# Patient Record
Sex: FEMALE | Race: WHITE | NOT HISPANIC OR LATINO | URBAN - METROPOLITAN AREA
[De-identification: names, ages, dates, MRNs, and addresses within clinical notes are randomized per-mention and may not be internally consistent; named-entity substitution may affect disease eponyms.]

---

## 2017-05-05 ENCOUNTER — FOLLOW UP ESTABLISHED (OUTPATIENT)
Dept: URBAN - METROPOLITAN AREA CLINIC 30 | Facility: CLINIC | Age: 82
End: 2017-05-05
Payer: MEDICARE

## 2017-05-05 DIAGNOSIS — H43.813 VITREOUS DEGENERATION, BILATERAL: ICD-10-CM

## 2017-05-05 DIAGNOSIS — H35.342 MACULAR CYST, HOLE, OR PSEUDOHOLE, LEFT EYE: Primary | ICD-10-CM

## 2017-05-05 PROCEDURE — 92014 COMPRE OPH EXAM EST PT 1/>: CPT | Performed by: OPTOMETRIST

## 2017-05-05 PROCEDURE — 92134 CPTRZ OPH DX IMG PST SGM RTA: CPT | Performed by: OPTOMETRIST

## 2017-05-05 RX ORDER — PREDNISOLONE ACETATE 10 MG/ML
1 % SUSPENSION/ DROPS OPHTHALMIC
Qty: 1 | Refills: 0 | Status: INACTIVE
Start: 2017-05-05 | End: 2017-06-06

## 2017-05-05 ASSESSMENT — VISUAL ACUITY
OD: 20/70
OS: 20/125

## 2017-05-05 ASSESSMENT — INTRAOCULAR PRESSURE
OD: 18
OS: 15

## 2017-06-06 ENCOUNTER — FOLLOW UP ESTABLISHED (OUTPATIENT)
Dept: URBAN - METROPOLITAN AREA CLINIC 30 | Facility: CLINIC | Age: 82
End: 2017-06-06
Payer: MEDICARE

## 2017-06-06 PROCEDURE — 92133 CPTRZD OPH DX IMG PST SGM ON: CPT | Performed by: OPTOMETRIST

## 2017-06-06 PROCEDURE — 92025 CPTRIZED CORNEAL TOPOGRAPHY: CPT | Performed by: OPTOMETRIST

## 2017-06-06 RX ORDER — BIMATOPROST 0.1 MG/ML
0.01 % SOLUTION/ DROPS OPHTHALMIC
Qty: 3 | Refills: 3 | Status: INACTIVE
Start: 2017-06-06 | End: 2017-09-20

## 2017-06-06 RX ORDER — PREDNISOLONE ACETATE 10 MG/ML
1 % SUSPENSION/ DROPS OPHTHALMIC
Qty: 1 | Refills: 0 | Status: INACTIVE
Start: 2017-06-06 | End: 2018-01-09

## 2017-06-06 ASSESSMENT — VISUAL ACUITY
OS: 20/60
OD: 20/50

## 2017-06-06 ASSESSMENT — KERATOMETRY
OD: 43.03
OS: 43.34

## 2017-06-06 ASSESSMENT — INTRAOCULAR PRESSURE
OS: 18
OD: 17

## 2017-07-20 ENCOUNTER — FOLLOW UP ESTABLISHED (OUTPATIENT)
Dept: URBAN - METROPOLITAN AREA CLINIC 30 | Facility: CLINIC | Age: 82
End: 2017-07-20
Payer: MEDICARE

## 2017-07-20 DIAGNOSIS — H18.59 OTHER HEREDITARY CORNEAL DYSTROPHIES: ICD-10-CM

## 2017-07-20 PROCEDURE — 99213 OFFICE O/P EST LOW 20 MIN: CPT | Performed by: OPTOMETRIST

## 2017-07-20 ASSESSMENT — VISUAL ACUITY
OS: 20/60
OD: 20/50

## 2017-07-20 ASSESSMENT — INTRAOCULAR PRESSURE
OD: 21
OS: 19

## 2017-07-20 ASSESSMENT — KERATOMETRY
OS: 42.45
OD: 43.15

## 2018-01-09 ENCOUNTER — FOLLOW UP ESTABLISHED (OUTPATIENT)
Dept: URBAN - METROPOLITAN AREA CLINIC 30 | Facility: CLINIC | Age: 83
End: 2018-01-09
Payer: MEDICARE

## 2018-01-09 PROCEDURE — 99213 OFFICE O/P EST LOW 20 MIN: CPT | Performed by: OPTOMETRIST

## 2018-01-09 ASSESSMENT — INTRAOCULAR PRESSURE
OD: 15
OS: 14

## 2018-01-09 ASSESSMENT — KERATOMETRY
OD: 42.80
OS: 42.94

## 2018-03-14 ENCOUNTER — FOLLOW UP ESTABLISHED (OUTPATIENT)
Dept: URBAN - METROPOLITAN AREA CLINIC 30 | Facility: CLINIC | Age: 83
End: 2018-03-14
Payer: MEDICARE

## 2018-03-14 PROCEDURE — 92012 INTRM OPH EXAM EST PATIENT: CPT | Performed by: OPTOMETRIST

## 2018-03-14 RX ORDER — PREDNISOLONE ACETATE 10 MG/ML
1 % SUSPENSION/ DROPS OPHTHALMIC
Qty: 5 | Refills: 1 | Status: INACTIVE
Start: 2018-03-14 | End: 2018-07-03

## 2018-03-14 ASSESSMENT — INTRAOCULAR PRESSURE
OS: 19
OD: 20

## 2018-07-03 ENCOUNTER — FOLLOW UP ESTABLISHED (OUTPATIENT)
Dept: URBAN - METROPOLITAN AREA CLINIC 30 | Facility: CLINIC | Age: 83
End: 2018-07-03
Payer: MEDICARE

## 2018-07-03 PROCEDURE — 92134 CPTRZ OPH DX IMG PST SGM RTA: CPT | Performed by: OPTOMETRIST

## 2018-07-03 PROCEDURE — 92014 COMPRE OPH EXAM EST PT 1/>: CPT | Performed by: OPTOMETRIST

## 2018-07-03 ASSESSMENT — VISUAL ACUITY
OS: 20/50
OD: 20/50

## 2018-07-03 ASSESSMENT — INTRAOCULAR PRESSURE
OS: 17
OS: 14
OD: 16
OD: 17

## 2018-08-20 ENCOUNTER — FOLLOW UP ESTABLISHED (OUTPATIENT)
Dept: URBAN - METROPOLITAN AREA CLINIC 30 | Facility: CLINIC | Age: 83
End: 2018-08-20
Payer: MEDICARE

## 2018-08-20 PROCEDURE — 92012 INTRM OPH EXAM EST PATIENT: CPT | Performed by: OPTOMETRIST

## 2018-08-20 PROCEDURE — 92133 CPTRZD OPH DX IMG PST SGM ON: CPT | Performed by: OPTOMETRIST

## 2018-08-20 PROCEDURE — 92083 EXTENDED VISUAL FIELD XM: CPT | Performed by: OPTOMETRIST

## 2018-08-20 RX ORDER — POLYETHYLENE GLYCOL 400 AND PROPYLENE GLYCOL 4; 3 MG/ML; MG/ML
SOLUTION/ DROPS OPHTHALMIC
Qty: 0 | Refills: 0 | Status: ACTIVE
Start: 2018-08-20

## 2018-08-20 ASSESSMENT — INTRAOCULAR PRESSURE
OS: 17
OD: 17

## 2018-08-20 ASSESSMENT — VISUAL ACUITY
OS: 20/60
OD: 20/30

## 2019-02-25 ENCOUNTER — FOLLOW UP ESTABLISHED (OUTPATIENT)
Dept: URBAN - METROPOLITAN AREA CLINIC 30 | Facility: CLINIC | Age: 84
End: 2019-02-25
Payer: MEDICARE

## 2019-02-25 PROCEDURE — 92083 EXTENDED VISUAL FIELD XM: CPT | Performed by: OPTOMETRIST

## 2019-02-25 PROCEDURE — 99213 OFFICE O/P EST LOW 20 MIN: CPT | Performed by: OPTOMETRIST

## 2019-02-25 ASSESSMENT — INTRAOCULAR PRESSURE
OS: 16
OD: 17

## 2019-08-26 ENCOUNTER — FOLLOW UP ESTABLISHED (OUTPATIENT)
Dept: URBAN - METROPOLITAN AREA CLINIC 30 | Facility: CLINIC | Age: 84
End: 2019-08-26
Payer: MEDICARE

## 2019-08-26 PROCEDURE — 92133 CPTRZD OPH DX IMG PST SGM ON: CPT | Performed by: OPTOMETRIST

## 2019-08-26 PROCEDURE — 92014 COMPRE OPH EXAM EST PT 1/>: CPT | Performed by: OPTOMETRIST

## 2019-08-26 ASSESSMENT — KERATOMETRY
OS: 43.24
OD: 42.53

## 2019-08-26 ASSESSMENT — VISUAL ACUITY
OS: 20/40
OD: 20/30

## 2019-08-26 ASSESSMENT — INTRAOCULAR PRESSURE
OS: 15
OD: 15

## 2020-02-26 ENCOUNTER — FOLLOW UP ESTABLISHED (OUTPATIENT)
Dept: URBAN - METROPOLITAN AREA CLINIC 30 | Facility: CLINIC | Age: 85
End: 2020-02-26
Payer: MEDICARE

## 2020-02-26 DIAGNOSIS — H16.223 KERATOCONJUNCT SICCA, NOT SPECIFIED AS SJOGREN'S, BILATERAL: ICD-10-CM

## 2020-02-26 PROCEDURE — 99213 OFFICE O/P EST LOW 20 MIN: CPT | Performed by: OPTOMETRIST

## 2020-02-26 PROCEDURE — 92083 EXTENDED VISUAL FIELD XM: CPT | Performed by: OPTOMETRIST

## 2020-02-26 RX ORDER — CYCLOSPORINE 0.5 MG/ML
0.05 % EMULSION OPHTHALMIC
Qty: 60 | Refills: 6 | Status: INACTIVE
Start: 2020-02-26 | End: 2020-08-26

## 2020-02-26 ASSESSMENT — INTRAOCULAR PRESSURE
OS: 15
OD: 15

## 2020-08-26 ENCOUNTER — FOLLOW UP ESTABLISHED (OUTPATIENT)
Dept: URBAN - METROPOLITAN AREA CLINIC 30 | Facility: CLINIC | Age: 85
End: 2020-08-26
Payer: MEDICARE

## 2020-08-26 PROCEDURE — 92014 COMPRE OPH EXAM EST PT 1/>: CPT | Performed by: OPTOMETRIST

## 2020-08-26 PROCEDURE — 92134 CPTRZ OPH DX IMG PST SGM RTA: CPT | Performed by: OPTOMETRIST

## 2020-08-26 ASSESSMENT — KERATOMETRY
OS: 43.25
OD: 42.50

## 2020-08-26 ASSESSMENT — INTRAOCULAR PRESSURE
OS: 16
OD: 19

## 2020-08-26 ASSESSMENT — VISUAL ACUITY
OD: 20/40
OS: 20/80

## 2021-03-29 ENCOUNTER — FOLLOW UP ESTABLISHED (OUTPATIENT)
Dept: URBAN - METROPOLITAN AREA CLINIC 30 | Facility: CLINIC | Age: 86
End: 2021-03-29
Payer: MEDICARE

## 2021-03-29 DIAGNOSIS — H40.1132 PRIMARY OPEN-ANGLE GLAUCOMA, BILATERAL, MODERATE STAGE: Primary | ICD-10-CM

## 2021-03-29 DIAGNOSIS — H52.4 PRESBYOPIA: ICD-10-CM

## 2021-03-29 PROCEDURE — 92083 EXTENDED VISUAL FIELD XM: CPT | Performed by: OPTOMETRIST

## 2021-03-29 PROCEDURE — 99214 OFFICE O/P EST MOD 30 MIN: CPT | Performed by: OPTOMETRIST

## 2021-03-29 PROCEDURE — 92133 CPTRZD OPH DX IMG PST SGM ON: CPT | Performed by: OPTOMETRIST

## 2021-03-29 ASSESSMENT — INTRAOCULAR PRESSURE
OS: 18
OD: 19

## 2021-03-29 ASSESSMENT — VISUAL ACUITY
OD: 20/30
OS: 20/60

## 2021-06-18 ENCOUNTER — OFFICE VISIT (OUTPATIENT)
Dept: URBAN - METROPOLITAN AREA CLINIC 24 | Facility: CLINIC | Age: 86
End: 2021-06-18
Payer: MEDICARE

## 2021-06-18 PROCEDURE — 92020 GONIOSCOPY: CPT | Performed by: OPHTHALMOLOGY

## 2021-06-18 PROCEDURE — 99204 OFFICE O/P NEW MOD 45 MIN: CPT | Performed by: OPHTHALMOLOGY

## 2021-06-18 PROCEDURE — 92083 EXTENDED VISUAL FIELD XM: CPT | Performed by: OPHTHALMOLOGY

## 2021-06-18 RX ORDER — PREDNISOLONE ACETATE 10 MG/ML
1 % SUSPENSION/ DROPS OPHTHALMIC
Qty: 5 | Refills: 1 | Status: INACTIVE
Start: 2021-06-18 | End: 2022-01-05

## 2021-06-18 RX ORDER — PREDNISOLONE ACETATE 10 MG/ML
1 % SUSPENSION/ DROPS OPHTHALMIC
Qty: 5 | Refills: 1 | Status: INACTIVE
Start: 2021-06-18 | End: 2021-06-18

## 2021-06-18 ASSESSMENT — INTRAOCULAR PRESSURE
OS: 20
OD: 21

## 2021-06-18 NOTE — IMPRESSION/PLAN
Impression: Keratoconjunctivitis sicca, bilateral
+Sjogren's per rheumatology; Xerostomia. Plan:  Cont AT's and rec's from general clinic,   monitor in general clinic.

## 2021-06-18 NOTE — IMPRESSION/PLAN
Impression: Other hereditary corneal dystrophies ; OU Plan: monitor in general clinic.; offered meghan andrade

## 2021-06-18 NOTE — IMPRESSION/PLAN
Impression: Primary open-angle glaucoma, moderate stage, bilateral
Thin Pachys 482,487,  Travatan Z, d/c'd due to worsening and irritation
- hx of asthma  Plan: PLAN: ON  Lumigan qhs OU, (Dr. Celine Blackburn was considering Latanoprost);   VFT fluctuates  and Photos done today will use to follow. IOP is elevated ou , discussed options, son present, and opts for slt os then od due to prior drop issues; cont lum and schedule slt os then od,    ((TARGET ~< to determine OU)) TESTS:   
06/18/21 Visual Field - OD: Good- Enlarged blind spot superior, inferior scatter depression; OS: Fair-inferior nasal depression 06/18/21 Photo Optos - OD: Fair-cupping; OS Fair-cupping RNFL 03/2021 OD) sup 69 inf 76 OS) sup 69 Discussed glaucoma diagnosis in detail with patient. Emphasized and explained compliance. Poor compliance can lead to blindness. Educational materials provided For SLT: 1. The patient is aware that SLT can lower IOP  for a period of time. 2. The patient is aware that SLT does not replace their current eye drop medications. 3. The patient is aware the SLT will not improve their vision. 4. The patient is aware the SLT will not cure their glaucoma nor replace any other medical or surgical treatments.  Given brochure

## 2021-06-30 ENCOUNTER — SURGERY (OUTPATIENT)
Dept: URBAN - METROPOLITAN AREA SURGERY 12 | Facility: SURGERY | Age: 86
End: 2021-06-30
Payer: MEDICARE

## 2021-06-30 PROCEDURE — 65855 TRABECULOPLASTY LASER SURG: CPT | Performed by: OPHTHALMOLOGY

## 2021-07-09 ENCOUNTER — OFFICE VISIT (OUTPATIENT)
Dept: URBAN - METROPOLITAN AREA CLINIC 10 | Facility: CLINIC | Age: 86
End: 2021-07-09
Payer: MEDICARE

## 2021-07-09 DIAGNOSIS — M35.01 SICCA SYNDROME WITH KERATOCONJUNCTIVITIS: Primary | ICD-10-CM

## 2021-07-09 PROCEDURE — 92025 CPTRIZED CORNEAL TOPOGRAPHY: CPT | Performed by: OPHTHALMOLOGY

## 2021-07-09 PROCEDURE — 99214 OFFICE O/P EST MOD 30 MIN: CPT | Performed by: OPHTHALMOLOGY

## 2021-07-09 PROCEDURE — 76514 ECHO EXAM OF EYE THICKNESS: CPT | Performed by: OPHTHALMOLOGY

## 2021-07-09 RX ORDER — CYCLOSPORINE 0.5 MG/ML
0.05 % EMULSION OPHTHALMIC
Qty: 60 | Refills: 1 | Status: ACTIVE
Start: 2021-07-09

## 2021-07-09 ASSESSMENT — VISUAL ACUITY
OD: 20/40
OS: 20/125

## 2021-07-09 ASSESSMENT — INTRAOCULAR PRESSURE
OD: 15
OS: 15

## 2021-07-09 NOTE — IMPRESSION/PLAN
Impression: Other hereditary corneal dystrophies ; OU Plan: Mild EBMD, mostly crocodile shagreen. SPH limited OU, may not improve with anterior keratectomy. Given her age, I would hold off on surgical intervention.

## 2021-07-09 NOTE — IMPRESSION/PLAN
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
- h/o sjogrens - PP placed by Dr. Stephanie Shelby: The patient is noted to have moderate keratoconjunctivitis sicca. The condition has been explained to the patient. Recommend preservative free Artificial Tear drops q1-2hr w/a, Artificial Tear kali QHS OU and environment changes. Start Restasis BID OU. Patient expressed understanding. 
Return to Dr. Lee Charles for Dry eye tx

## 2021-07-14 ENCOUNTER — SURGERY (OUTPATIENT)
Dept: URBAN - METROPOLITAN AREA SURGERY 12 | Facility: SURGERY | Age: 86
End: 2021-07-14
Payer: MEDICARE

## 2021-07-14 PROCEDURE — 65855 TRABECULOPLASTY LASER SURG: CPT | Performed by: OPHTHALMOLOGY

## 2021-08-04 ENCOUNTER — OFFICE VISIT (OUTPATIENT)
Dept: URBAN - METROPOLITAN AREA CLINIC 24 | Facility: CLINIC | Age: 86
End: 2021-08-04
Payer: MEDICARE

## 2021-08-04 PROCEDURE — 99213 OFFICE O/P EST LOW 20 MIN: CPT | Performed by: OPHTHALMOLOGY

## 2021-08-04 ASSESSMENT — INTRAOCULAR PRESSURE
OD: 16
OS: 16

## 2021-08-04 NOTE — IMPRESSION/PLAN
Impression: Other hereditary corneal dystrophies ; OU Plan: monitor in general clinic.; seen by cornea

## 2021-08-04 NOTE — IMPRESSION/PLAN
Impression: Primary open-angle glaucoma, moderate stage, bilateral
Thin Pachys 482,487,  Travatan Z, d/c'd due to worsening and irritation
- hx of asthma Plan: PLAN: On Lumigan qhs OU,  ; s/p SLT OU ; IOP is improved ou pSLT , so cont lum and return in  4-5 months for IOP Check   ((TARGET ~< to determine OU)) TESTS:   
06/18/21 Visual Field - OD: Good- Enlarged blind spot superior, inferior scatter depression; OS: Fair-inferior nasal depression 06/18/21 Photo Optos - OD: Fair-cupping; OS Fair-cupping RNFL 03/2021 OD) sup 69 inf 76 OS) sup 69 Discussed glaucoma diagnosis in detail with patient. Emphasized and explained compliance. Poor compliance can lead to blindness.  Educational materials provided

## 2021-08-04 NOTE — IMPRESSION/PLAN
Impression: Keratoconjunctivitis sicca, bilateral
+Sjogren's per rheumatology; Xerostomia. Plan: Cont AT's and rec's from general clinic,   monitor in general clinic still.

## 2022-01-05 ENCOUNTER — OFFICE VISIT (OUTPATIENT)
Dept: URBAN - METROPOLITAN AREA CLINIC 24 | Facility: CLINIC | Age: 87
End: 2022-01-05
Payer: MEDICARE

## 2022-01-05 DIAGNOSIS — H18.593 OTHER HEREDITARY CORNEAL DYSTROPHIES: ICD-10-CM

## 2022-01-05 DIAGNOSIS — F03.90 DEMENTIA: ICD-10-CM

## 2022-01-05 DIAGNOSIS — H57.02 ANISOCORIA: ICD-10-CM

## 2022-01-05 PROCEDURE — 92133 CPTRZD OPH DX IMG PST SGM ON: CPT | Performed by: OPHTHALMOLOGY

## 2022-01-05 PROCEDURE — 99213 OFFICE O/P EST LOW 20 MIN: CPT | Performed by: OPHTHALMOLOGY

## 2022-01-05 ASSESSMENT — INTRAOCULAR PRESSURE
OD: 16
OS: 16

## 2022-01-05 NOTE — IMPRESSION/PLAN
Impression: Primary open-angle glaucoma, moderate stage, bilateral
Thin Pachys 482,487,  Travatan Z, d/c'd due to worsening and irritation
- hx of asthma Plan: PLAN: On Lumigan qhs OU,  ; s/p SLT OU ; IOP is  stable ou , so cont lum and return in  4-5 months for IOP Check/VFT if able   ((TARGET ~< to determine OU)) TESTS:
1-5-22 OCT/OCT - OD: Poor-71 sup thinning; artifacts; OS: Poor-70 sup/inf thinning; several artifacts 06/18/21 Visual Field - OD: Good- Enlarged blind spot superior, inferior scatter depression; OS: Fair-inferior nasal depression 06/18/21 Photo Optos - OD: Fair-cupping; OS Fair-cupping Discussed glaucoma diagnosis in detail with patient. Emphasized and explained compliance. Poor compliance can lead to blindness.  Educational materials provided

## 2022-01-05 NOTE — IMPRESSION/PLAN
Impression: Keratoconjunctivitis sicca, bilateral
+Sjogren's per rheumatology; Xerostomia. Plan: use pf AT's and rec's from general clinic,   monitor in general clinic/cornea still.

## 2022-01-05 NOTE — IMPRESSION/PLAN
Impression: Macular cyst, hole, or psuedohole, left eye Plan: Monitors in general clinic;  Discussed signs and symptoms of retinal detachment (flashes,floaters, curtain) as precaution. Patient instructed to call or return to clinic if condition gets worse.

## 2022-06-08 ENCOUNTER — OFFICE VISIT (OUTPATIENT)
Dept: URBAN - METROPOLITAN AREA CLINIC 24 | Facility: CLINIC | Age: 87
End: 2022-06-08
Payer: MEDICARE

## 2022-06-08 DIAGNOSIS — H57.02 ANISOCORIA: ICD-10-CM

## 2022-06-08 DIAGNOSIS — H35.342 MACULAR CYST, HOLE, OR PSEUDOHOLE, LEFT EYE: ICD-10-CM

## 2022-06-08 DIAGNOSIS — H16.223 KERATOCONJUNCTIVITIS SICCA, NOT SPECIFIED AS SJOGREN'S, BILATERAL: ICD-10-CM

## 2022-06-08 DIAGNOSIS — F03.90 DEMENTIA: ICD-10-CM

## 2022-06-08 DIAGNOSIS — H18.593 OTHER HEREDITARY CORNEAL DYSTROPHIES: ICD-10-CM

## 2022-06-08 DIAGNOSIS — H40.1132 PRIMARY OPEN-ANGLE GLAUCOMA, BILATERAL, MODERATE STAGE: Primary | ICD-10-CM

## 2022-06-08 PROCEDURE — 99213 OFFICE O/P EST LOW 20 MIN: CPT | Performed by: OPHTHALMOLOGY

## 2022-06-08 PROCEDURE — 92083 EXTENDED VISUAL FIELD XM: CPT | Performed by: OPHTHALMOLOGY

## 2022-06-08 RX ORDER — BIMATOPROST 0.1 MG/ML
0.01 % SOLUTION/ DROPS OPHTHALMIC
Qty: 7.5 | Refills: 1 | Status: ACTIVE
Start: 2022-06-08

## 2022-06-08 ASSESSMENT — INTRAOCULAR PRESSURE
OS: 16
OD: 16

## 2022-06-08 NOTE — IMPRESSION/PLAN
Impression: Primary open-angle glaucoma, moderate stage, bilateral
Thin Pachys 482,487,  Travatan Z, d/c'd due to worsening and irritation
- hx of asthma Plan: PLAN: On Lumigan qhs OU,  ; s/p SLT OU ;VFT similar ou and IOP is  stable ou , so cont lum and return in  4-5 months for IOP Check   ((TARGET ~<upper teens OU for now )) TESTS:
06/08/22 Visual Field - OD: Fair-Enlarge blind spot; scattered depression; OS: Fair-scattered defects 1-5-22 OCT - OD: Poor-71 sup thinning; artifacts; OS: Poor-70 sup/inf thinning; several artifacts   
depression; OS: Fair-inferior nasal depression 06/18/21 Photo Optos - OD: Fair-cupping; OS Fair-cupping Discussed glaucoma diagnosis in detail with patient. Emphasized and explained compliance. Poor compliance can lead to blindness.  Educational materials provided

## 2022-11-04 ENCOUNTER — OFFICE VISIT (OUTPATIENT)
Dept: URBAN - METROPOLITAN AREA CLINIC 24 | Facility: CLINIC | Age: 87
End: 2022-11-04
Payer: MEDICARE

## 2022-11-04 DIAGNOSIS — H35.342 MACULAR CYST, HOLE, OR PSEUDOHOLE, LEFT EYE: ICD-10-CM

## 2022-11-04 DIAGNOSIS — H16.223 KERATOCONJUNCTIVITIS SICCA, NOT SPECIFIED AS SJOGREN'S, BILATERAL: ICD-10-CM

## 2022-11-04 DIAGNOSIS — F03.90 DEMENTIA: ICD-10-CM

## 2022-11-04 DIAGNOSIS — H57.02 ANISOCORIA: ICD-10-CM

## 2022-11-04 DIAGNOSIS — H40.1132 PRIMARY OPEN-ANGLE GLAUCOMA, BILATERAL, MODERATE STAGE: Primary | ICD-10-CM

## 2022-11-04 DIAGNOSIS — H18.593 OTHER HEREDITARY CORNEAL DYSTROPHIES: ICD-10-CM

## 2022-11-04 PROCEDURE — 99213 OFFICE O/P EST LOW 20 MIN: CPT | Performed by: OPHTHALMOLOGY

## 2022-11-04 ASSESSMENT — INTRAOCULAR PRESSURE
OD: 16
OS: 16

## 2022-11-04 NOTE — IMPRESSION/PLAN
Impression: Primary open-angle glaucoma, moderate stage, bilateral
Thin Pachys 482,487,  Travatan Z, d/c'd due to worsening and irritation
- hx of asthma Plan: PLAN: On Lumigan qhs OU,  ; s/p SLT OU ; IOP is  holding  ou , so cont lum and return in  4-5 months for IOP Check (testing may be limited )   ((TARGET ~<upper teens OU for now )) TESTS:
06/08/22 Visual Field - OD: Fair-Enlarge blind spot; scattered depression; OS: Fair-scattered defects 1-5-22 OCT - OD: Poor-71 sup thinning; artifacts; OS: Poor-70 sup/inf thinning; several artifacts   
depression; OS: Fair-inferior nasal depression 06/18/21 Photo Optos - OD: Fair-cupping; OS Fair-cupping Discussed glaucoma diagnosis in detail with patient. Emphasized and explained compliance. Poor compliance can lead to blindness.  Educational materials provided

## 2022-11-04 NOTE — IMPRESSION/PLAN
Impression: Macular cyst, hole, or psuedohole, left eye Plan: Monitors in general clinic;  Discussed signs and symptoms of retinal detachment (flashes,floaters, curtain) as precaution. Patient instructed to call or return to clinic if condition gets worse. Discussed monocular precautions with patient. Advised pt to wear protective eyewear.

## 2023-03-24 ENCOUNTER — OFFICE VISIT (OUTPATIENT)
Dept: URBAN - METROPOLITAN AREA CLINIC 24 | Facility: CLINIC | Age: 88
End: 2023-03-24
Payer: MEDICARE

## 2023-03-24 DIAGNOSIS — F03.90 DEMENTIA: ICD-10-CM

## 2023-03-24 DIAGNOSIS — H35.342 MACULAR CYST, HOLE, OR PSEUDOHOLE, LEFT EYE: ICD-10-CM

## 2023-03-24 DIAGNOSIS — H18.593 OTHER HEREDITARY CORNEAL DYSTROPHIES: ICD-10-CM

## 2023-03-24 DIAGNOSIS — H40.1132 PRIMARY OPEN-ANGLE GLAUCOMA, BILATERAL, MODERATE STAGE: Primary | ICD-10-CM

## 2023-03-24 DIAGNOSIS — H16.223 KERATOCONJUNCTIVITIS SICCA, NOT SPECIFIED AS SJOGREN'S, BILATERAL: ICD-10-CM

## 2023-03-24 DIAGNOSIS — H57.02 ANISOCORIA: ICD-10-CM

## 2023-03-24 PROCEDURE — 99213 OFFICE O/P EST LOW 20 MIN: CPT | Performed by: OPHTHALMOLOGY

## 2023-03-24 RX ORDER — BIMATOPROST 0.1 MG/ML
0.01 % SOLUTION/ DROPS OPHTHALMIC
Qty: 7.5 | Refills: 3 | Status: ACTIVE
Start: 2023-03-24

## 2023-03-24 ASSESSMENT — INTRAOCULAR PRESSURE
OD: 16
OS: 17

## 2023-03-24 NOTE — IMPRESSION/PLAN
Impression: Primary open-angle glaucoma, moderate stage, bilateral
Thin Pachys 482,487,  Travatan Z, d/c'd due to worsening and irritation
- hx of asthma Plan: PLAN: On Lumigan qhs OU,  ; s/p SLT OU ; IOP is stable  ou , so cont lum and return in  4-5 months for IOP Check (testing may be limited )   ((TARGET ~<upper teens OU for now )) TESTS:
06/08/22 Visual Field - OD: Fair-Enlarge blind spot; scattered depression; OS: Fair-scattered defects 1-5-22 OCT - OD: Poor-71 sup thinning; artifacts; OS: Poor-70 sup/inf thinning; several artifacts   
depression; OS: Fair-inferior nasal depression 06/18/21 Photo Optos - OD: Fair-cupping; OS Fair-cupping Discussed glaucoma diagnosis in detail with patient. Emphasized and explained compliance. Poor compliance can lead to blindness.  Educational materials provided